# Patient Record
Sex: FEMALE | URBAN - METROPOLITAN AREA
[De-identification: names, ages, dates, MRNs, and addresses within clinical notes are randomized per-mention and may not be internally consistent; named-entity substitution may affect disease eponyms.]

---

## 2018-05-21 ENCOUNTER — APPOINTMENT (RX ONLY)
Dept: URBAN - METROPOLITAN AREA CLINIC 63 | Facility: CLINIC | Age: 58
Setting detail: DERMATOLOGY
End: 2018-05-21

## 2018-05-21 DIAGNOSIS — L71.0 PERIORAL DERMATITIS: ICD-10-CM

## 2018-05-21 PROBLEM — G40.909 EPILEPSY, UNSPECIFIED, NOT INTRACTABLE, WITHOUT STATUS EPILEPTICUS: Status: ACTIVE | Noted: 2018-05-21

## 2018-05-21 PROCEDURE — ? TREATMENT REGIMEN

## 2018-05-21 PROCEDURE — 99202 OFFICE O/P NEW SF 15 MIN: CPT

## 2018-05-21 PROCEDURE — ? COUNSELING

## 2018-05-21 PROCEDURE — ? PRESCRIPTION

## 2018-05-21 RX ORDER — METRONIDAZOLE 10 MG/G
GEL TOPICAL QHS
Qty: 1 | Refills: 3 | Status: ERX | COMMUNITY
Start: 2018-05-21

## 2018-05-21 RX ORDER — DOXYCYCLINE HYCLATE 100 MG/1
CAPSULE, GELATIN COATED ORAL
Qty: 60 | Refills: 2 | Status: ERX | COMMUNITY
Start: 2018-05-21

## 2018-05-21 RX ADMIN — DOXYCYCLINE HYCLATE: 100 CAPSULE, GELATIN COATED ORAL at 00:00

## 2018-05-21 RX ADMIN — METRONIDAZOLE: 10 GEL TOPICAL at 00:00

## 2018-05-21 ASSESSMENT — LOCATION DETAILED DESCRIPTION DERM
LOCATION DETAILED: LEFT CHIN
LOCATION DETAILED: RIGHT LOWER CUTANEOUS LIP
LOCATION DETAILED: LEFT MEDIAL BUCCAL CHEEK

## 2018-05-21 ASSESSMENT — LOCATION ZONE DERM
LOCATION ZONE: FACE
LOCATION ZONE: LIP

## 2018-05-21 ASSESSMENT — LOCATION SIMPLE DESCRIPTION DERM
LOCATION SIMPLE: RIGHT LIP
LOCATION SIMPLE: LEFT CHEEK
LOCATION SIMPLE: CHIN

## 2018-05-21 NOTE — HPI: BLISTERS
How Severe Are Your Blisters?: mild
Please Check The Phrase That Best Describes Your Blisters.: few in number
Is This A New Presentation, Or A Follow-Up?: Blisters
Additional History: Clotrimazole and betamethasone dipropionate Anti virus medication

## 2018-05-30 ENCOUNTER — RX ONLY (OUTPATIENT)
Age: 58
Setting detail: RX ONLY
End: 2018-05-30

## 2018-05-30 RX ORDER — MINOCYCLINE HYDROCHLORIDE 100 MG/1
CAPSULE ORAL
Qty: 60 | Refills: 1 | Status: ERX | COMMUNITY
Start: 2018-05-30

## 2018-07-19 ENCOUNTER — APPOINTMENT (RX ONLY)
Dept: URBAN - METROPOLITAN AREA CLINIC 63 | Facility: CLINIC | Age: 58
Setting detail: DERMATOLOGY
End: 2018-07-19

## 2018-07-19 DIAGNOSIS — L81.4 OTHER MELANIN HYPERPIGMENTATION: ICD-10-CM

## 2018-07-19 DIAGNOSIS — L71.0 PERIORAL DERMATITIS: ICD-10-CM

## 2018-07-19 PROCEDURE — 99213 OFFICE O/P EST LOW 20 MIN: CPT

## 2018-07-19 PROCEDURE — ? DEFER

## 2018-07-19 PROCEDURE — ? TREATMENT REGIMEN

## 2018-07-19 PROCEDURE — ? COUNSELING

## 2018-07-19 ASSESSMENT — LOCATION SIMPLE DESCRIPTION DERM
LOCATION SIMPLE: LEFT CHEEK
LOCATION SIMPLE: RIGHT CHEEK
LOCATION SIMPLE: RIGHT LIP
LOCATION SIMPLE: CHIN
LOCATION SIMPLE: CHEST

## 2018-07-19 ASSESSMENT — LOCATION ZONE DERM
LOCATION ZONE: LIP
LOCATION ZONE: TRUNK
LOCATION ZONE: FACE

## 2018-07-19 ASSESSMENT — LOCATION DETAILED DESCRIPTION DERM
LOCATION DETAILED: LEFT MEDIAL BUCCAL CHEEK
LOCATION DETAILED: RIGHT LOWER CUTANEOUS LIP
LOCATION DETAILED: RIGHT MEDIAL SUPERIOR CHEST
LOCATION DETAILED: RIGHT INFERIOR CENTRAL MALAR CHEEK
LOCATION DETAILED: LEFT CHIN

## 2018-07-19 NOTE — PROCEDURE: TREATMENT REGIMEN
Detail Level: Zone
Continue Regimen: Metrogel 1% daily, prn flares
Otc Regimen: Honey
Modify Regimen: Doxycycline 100mg daily x 1 month, stop, bid for flares

## 2019-02-13 ENCOUNTER — APPOINTMENT (RX ONLY)
Dept: URBAN - METROPOLITAN AREA CLINIC 63 | Facility: CLINIC | Age: 59
Setting detail: DERMATOLOGY
End: 2019-02-13

## 2019-02-13 DIAGNOSIS — D22 MELANOCYTIC NEVI: ICD-10-CM

## 2019-02-13 DIAGNOSIS — L82.1 OTHER SEBORRHEIC KERATOSIS: ICD-10-CM

## 2019-02-13 DIAGNOSIS — D18.0 HEMANGIOMA: ICD-10-CM

## 2019-02-13 PROBLEM — L85.3 XEROSIS CUTIS: Status: ACTIVE | Noted: 2019-02-13

## 2019-02-13 PROBLEM — D18.01 HEMANGIOMA OF SKIN AND SUBCUTANEOUS TISSUE: Status: ACTIVE | Noted: 2019-02-13

## 2019-02-13 PROBLEM — D22.39 MELANOCYTIC NEVI OF OTHER PARTS OF FACE: Status: ACTIVE | Noted: 2019-02-13

## 2019-02-13 PROCEDURE — 99213 OFFICE O/P EST LOW 20 MIN: CPT

## 2019-02-13 PROCEDURE — ? COUNSELING

## 2019-02-13 ASSESSMENT — LOCATION DETAILED DESCRIPTION DERM
LOCATION DETAILED: RIGHT LATERAL MALAR CHEEK
LOCATION DETAILED: INFERIOR THORACIC SPINE

## 2019-02-13 ASSESSMENT — LOCATION ZONE DERM
LOCATION ZONE: TRUNK
LOCATION ZONE: FACE

## 2019-02-13 ASSESSMENT — LOCATION SIMPLE DESCRIPTION DERM
LOCATION SIMPLE: RIGHT CHEEK
LOCATION SIMPLE: UPPER BACK

## 2019-09-09 ENCOUNTER — APPOINTMENT (RX ONLY)
Dept: URBAN - METROPOLITAN AREA CLINIC 63 | Facility: CLINIC | Age: 59
Setting detail: DERMATOLOGY
End: 2019-09-09

## 2019-09-09 DIAGNOSIS — L82.1 OTHER SEBORRHEIC KERATOSIS: ICD-10-CM

## 2019-09-09 DIAGNOSIS — D22 MELANOCYTIC NEVI: ICD-10-CM

## 2019-09-09 DIAGNOSIS — D18.0 HEMANGIOMA: ICD-10-CM

## 2019-09-09 PROBLEM — D18.01 HEMANGIOMA OF SKIN AND SUBCUTANEOUS TISSUE: Status: ACTIVE | Noted: 2019-09-09

## 2019-09-09 PROBLEM — D22.39 MELANOCYTIC NEVI OF OTHER PARTS OF FACE: Status: ACTIVE | Noted: 2019-09-09

## 2019-09-09 PROBLEM — D22.5 MELANOCYTIC NEVI OF TRUNK: Status: ACTIVE | Noted: 2019-09-09

## 2019-09-09 PROCEDURE — 99214 OFFICE O/P EST MOD 30 MIN: CPT

## 2019-09-09 PROCEDURE — ? COUNSELING

## 2019-09-09 ASSESSMENT — LOCATION DETAILED DESCRIPTION DERM
LOCATION DETAILED: RIGHT LATERAL ZYGOMA
LOCATION DETAILED: RIGHT LATERAL MALAR CHEEK
LOCATION DETAILED: LEFT ANTERIOR PROXIMAL THIGH
LOCATION DETAILED: LEFT SUPERIOR UPPER BACK
LOCATION DETAILED: INFERIOR THORACIC SPINE

## 2019-09-09 ASSESSMENT — LOCATION ZONE DERM
LOCATION ZONE: TRUNK
LOCATION ZONE: FACE
LOCATION ZONE: LEG

## 2019-09-09 ASSESSMENT — LOCATION SIMPLE DESCRIPTION DERM
LOCATION SIMPLE: RIGHT ZYGOMA
LOCATION SIMPLE: LEFT UPPER BACK
LOCATION SIMPLE: LEFT THIGH
LOCATION SIMPLE: RIGHT CHEEK
LOCATION SIMPLE: UPPER BACK